# Patient Record
(demographics unavailable — no encounter records)

---

## 2024-10-09 NOTE — HISTORY OF PRESENT ILLNESS
[de-identified] : Return [facility] visit     Initial weight/BMI:  269 lbs/height 68 inches/ BMI 40.9     Current weight: 270     Weight change:gained 1 lb   HPI:   Since our last visit:      Response to AOM (if started): feels some appetite suppression but only on lowest dose    Tolerability: well tolerated    Response to meds    Weight loss: None    Side effects: None    Timing of medication   Changes in lifestyle habits: working with nutritionist

## 2024-10-09 NOTE — PHYSICAL EXAM
[Alert] : alert [Oriented to Person] : oriented to person [Oriented to Place] : oriented to place [Oriented to Time] : oriented to time [Calm] : calm [de-identified] : well appearing male in NAD

## 2024-10-09 NOTE — ASSESSMENT
[FreeTextEntry1] : Patient's weight is stable-> needs increased wegovy dose Patient is seeing nutritionist

## 2024-10-09 NOTE — PLAN
[FreeTextEntry1] :  1. Increase Wegovy to 0.5 mg weekly 2. Nutrition followup 3.  Followup Dr. Cleveland in 8 weeks 4. Protein intake at 80-90 gms daily 5. start multivitamin

## 2024-10-09 NOTE — REASON FOR VISIT
[Follow-Up: _____] : a [unfilled] follow-up visit [FreeTextEntry1] : Patient here for followup of [facility] program

## 2024-12-11 NOTE — HISTORY OF PRESENT ILLNESS
[de-identified] : Return [facility] visit     Initial weight/BMI:  269 lbs     Current weight: 269 lbs     Weight change: 0   HPI:   Since our last visit:      Response to AOM (if started): some appetite suppression at 0.5 mg but little weight loss    Tolerability: good    Response to meds    Weight loss: 0 lbs    Side effects: None    Timing of medication   Changes in lifestyle habits:    working with nutritionist

## 2024-12-11 NOTE — REASON FOR VISIT
[Follow-Up: _____] : a [unfilled] follow-up visit [FreeTextEntry1] : Patient is here for followup of [facility] program

## 2024-12-11 NOTE — PHYSICAL EXAM
[Alert] : alert [Oriented to Person] : oriented to person [Oriented to Place] : oriented to place [Oriented to Time] : oriented to time [Calm] : calm [de-identified] : well appearing male in NAD

## 2025-02-12 NOTE — PLAN
[FreeTextEntry1] :  1. Increase Wegovy dose to 1.7 mg weekly 2. Nutrition followup as scheduled 3.  Followup Dr. Cleveland in 8 weeks

## 2025-02-12 NOTE — PHYSICAL EXAM
[Alert] : alert [Oriented to Person] : oriented to person [Oriented to Place] : oriented to place [Oriented to Time] : oriented to time [Calm] : calm [de-identified] : well appearing male in NAD

## 2025-02-12 NOTE — HISTORY OF PRESENT ILLNESS
[de-identified] : Return [facility] visit     Initial weight/BMI:  269 lbs     Current weight:  266 lbs     Weight change:  3 lbs   HPI:   Since our last visit:      Response to AOM (if started): on wegovy compounded at dose of 1 mg weekly.  With start of 1 mg dose he definitely is noticing appetite suppression    Tolerability    Response to meds    Weight loss: 3 lbs    Side effects: None    Timing of medication   Changes in lifestyle habits:    meeting with nutritionist

## 2025-03-04 NOTE — END OF VISIT
[FreeTextEntry3] :  I obtained history/physical, formulated a treatment plan which I discussed with the patient and agree with the above transcription by the physicians assistant. [Time Spent: ___ minutes] : I have spent [unfilled] minutes of time on the encounter which excludes teaching and separately reported services.

## 2025-03-04 NOTE — ASSESSMENT
[FreeTextEntry1] : KUB, no stones identified on personal review.  Sonogram shows bilateral stones.  No hydronephrosis. Doing well and no pain.  Return to office 1 year, kidney bladder sonogram and same-day review at that visit.

## 2025-04-10 NOTE — REASON FOR VISIT
[Follow-Up: _____] : a [unfilled] follow-up visit [FreeTextEntry1] : Patient is here for follow up of [facility] program

## 2025-04-10 NOTE — PHYSICAL EXAM
[Alert] : alert [Oriented to Person] : oriented to person [Oriented to Place] : oriented to place [Oriented to Time] : oriented to time [Calm] : calm [de-identified] : well appearing male in NAD

## 2025-04-10 NOTE — PLAN
[FreeTextEntry1] : 1. Increase Wegovy dose to 2.4 mg weekly  2. Nutrition followup as scheduled 3.  Followup Dr. Cleveland in 8 weeks 4. continue strength training 5. keep protein intake up

## 2025-04-10 NOTE — HISTORY OF PRESENT ILLNESS
[de-identified] : Return [facility] visit     Initial weight/BMI: 269 lbs     Current weight: 264 lbs     Weight change: 5 lbs   HPI:   Since our last visit:      Response to AOM (if started): on compounded wegovy at dose of 1.7 mg weekly- feeling more appetite suppression but weight loss nominal    Tolerability    Response to meds    Weight loss: 5 lbs    Side effects: None    Timing of medication   Changes in lifestyle habits:    has met with nutritionist 4 times